# Patient Record
Sex: FEMALE | HISPANIC OR LATINO | Employment: UNEMPLOYED | URBAN - NONMETROPOLITAN AREA
[De-identification: names, ages, dates, MRNs, and addresses within clinical notes are randomized per-mention and may not be internally consistent; named-entity substitution may affect disease eponyms.]

---

## 2024-11-21 ENCOUNTER — OFFICE VISIT (OUTPATIENT)
Dept: OBSTETRICS AND GYNECOLOGY | Age: 19
End: 2024-11-21
Payer: MEDICAID

## 2024-11-21 VITALS
WEIGHT: 134.2 LBS | DIASTOLIC BLOOD PRESSURE: 72 MMHG | HEIGHT: 56 IN | BODY MASS INDEX: 30.19 KG/M2 | SYSTOLIC BLOOD PRESSURE: 106 MMHG

## 2024-11-21 DIAGNOSIS — Z30.09 ENCOUNTER FOR OTHER GENERAL COUNSELING OR ADVICE ON CONTRACEPTION: ICD-10-CM

## 2024-11-21 DIAGNOSIS — Z30.46 ENCOUNTER FOR NEXPLANON REMOVAL: Primary | ICD-10-CM

## 2024-11-21 NOTE — PROGRESS NOTES
"    Rome Herron MD  [facility]  [address]  [place] [address]  Office [phone]  Fax [phone]      [facility]  Isabella Arthur  : 2005  MRN: [id number]    Subjective   Subjective     Chief Complaint   Patient presents with    Contraception     Pt here as new gyn for Nexplanon removal. States this was inserted . Does not want new contraception.   ID # [id number]       History of Present Illness  Isabella Arthur is a 19 y.o. female , No obstetric history on file., who comes to the office today for nexplanon removal. Does not desire contraception. Not sexually active. .      Objective    Objective     Vitals:   Visit Vitals  /72   Ht 143 cm (56.3\")   Wt 60.9 kg (134 lb 3.2 oz)   BMI 29.77 kg/m²        Physical Exam  Vitals reviewed. Exam conducted with a chaperone present.   Constitutional:       General: She is not in acute distress.     Appearance: Normal appearance. She is not ill-appearing.   HENT:      Head: Normocephalic and atraumatic.      Nose: No congestion or rhinorrhea.   Eyes:      General: No scleral icterus.        Right eye: No discharge.         Left eye: No discharge.      Extraocular Movements: Extraocular movements intact.      Conjunctiva/sclera: Conjunctivae normal.   Pulmonary:      Effort: Pulmonary effort is normal. No accessory muscle usage or respiratory distress.   Musculoskeletal:        Arms:       Right lower leg: No edema.      Left lower leg: No edema.   Skin:     General: Skin is warm and dry.      Coloration: Skin is not ashen, cyanotic or jaundiced.   Neurological:      General: No focal deficit present.      Mental Status: She is alert and oriented to person, place, and time.   Psychiatric:         Mood and Affect: Mood normal.         Behavior: Behavior is cooperative.         Procedure   The following procedures were performed in the clinic today:  Nexplanon removal    Date/Time: 2024 10:12 AM    Performed by: Germaine" "Rome IBRAHIM MD  Authorized by: Rome Herron MD  Consent: Verbal consent obtained.  Risks and benefits: risks, benefits and alternatives were discussed  Consent given by: patient  Patient understanding: patient states understanding of the procedure being performed  Patient identity confirmed: verbally with patient  Time out: Immediately prior to procedure a \"time out\" was called to verify the correct patient, procedure, equipment, support staff and site/side marked as required.  Preparation: Patient was prepped and draped in the usual sterile fashion.  Local anesthesia used: yes  Anesthesia: local infiltration    Anesthesia:  Local anesthesia used: yes  Local Anesthetic: lidocaine 1% without epinephrine  Anesthetic total: 6 mL    Sedation:  Patient sedated: no    Patient tolerance: patient tolerated the procedure well with no immediate complications  Comments: Procedure: Consent obtained. A time-out was performed prior to initiating procedure to confirm correct patient, correct procedure, and correct location. The site was anesthetized with lidocaine. The area surrounding the Nexplanon was prepared and draped in the usual sterile manner. A skin incision was made over the distal aspect of the device. The capsule lysed sharply and the device removed using a hemostat. Hemostasis was assured. The device was visibly shown to the patient to confirm device removal. The Nexplanon was measured and was found to be 4 cm in length. The site was dressed with Dermabond/SteriStrips and a pressure dressing. The patient tolerated the procedure well.       Follow-up: The patient tolerated the procedure well without complications. Standard post-procedure care was discussed and return precautions are given. Contraception is advised until conception is desired. Discussed to remove the Steri-Strips in 3-5 days. Discussed to remove the compressive bandage in 24 hrs.                      Assessment & Plan   Assessment / Plan     Diagnoses " and all orders for this visit:    1. Encounter for Nexplanon removal (Primary)    2. Encounter for other general counseling or advice on contraception    Other orders  -     Remove Drug Implant Device        Discussion: Postprocedure care discussed. Patient declines contraception. Not sexually active. Recommended return for annual.     Follow-up: Return in about 3 months (around 2/21/2025), or if symptoms worsen or fail to improve, for annual.    Rome Herron MD

## 2025-05-20 ENCOUNTER — OFFICE VISIT (OUTPATIENT)
Dept: OBSTETRICS AND GYNECOLOGY | Age: 20
End: 2025-05-20

## 2025-05-20 VITALS
DIASTOLIC BLOOD PRESSURE: 70 MMHG | WEIGHT: 143 LBS | BODY MASS INDEX: 32.17 KG/M2 | SYSTOLIC BLOOD PRESSURE: 100 MMHG | HEIGHT: 56 IN

## 2025-05-20 DIAGNOSIS — N91.2 AMENORRHEA: Primary | ICD-10-CM

## 2025-05-20 PROCEDURE — 99214 OFFICE O/P EST MOD 30 MIN: CPT | Performed by: NURSE PRACTITIONER

## 2025-05-21 LAB
ALBUMIN SERPL-MCNC: 4.4 G/DL (ref 3.5–5.2)
ALBUMIN/GLOB SERPL: 1.4 G/DL
ALP SERPL-CCNC: 131 U/L (ref 39–117)
ALT SERPL-CCNC: 53 U/L (ref 1–33)
AST SERPL-CCNC: 30 U/L (ref 1–32)
BASOPHILS # BLD AUTO: 0.03 10*3/MM3 (ref 0–0.2)
BASOPHILS NFR BLD AUTO: 0.7 % (ref 0–1.5)
BILIRUB SERPL-MCNC: 0.6 MG/DL (ref 0–1.2)
BUN SERPL-MCNC: 15 MG/DL (ref 6–20)
BUN/CREAT SERPL: 30.6 (ref 7–25)
CALCIUM SERPL-MCNC: 9.3 MG/DL (ref 8.6–10.5)
CHLORIDE SERPL-SCNC: 103 MMOL/L (ref 98–107)
CO2 SERPL-SCNC: 25.1 MMOL/L (ref 22–29)
CREAT SERPL-MCNC: 0.49 MG/DL (ref 0.57–1)
EGFRCR SERPLBLD CKD-EPI 2021: 136.9 ML/MIN/1.73
EOSINOPHIL # BLD AUTO: 0.1 10*3/MM3 (ref 0–0.4)
EOSINOPHIL NFR BLD AUTO: 2.2 % (ref 0.3–6.2)
ERYTHROCYTE [DISTWIDTH] IN BLOOD BY AUTOMATED COUNT: 12.4 % (ref 12.3–15.4)
ESTRADIOL SERPL-MCNC: 42.8 PG/ML
FSH SERPL-ACNC: 6.1 MIU/ML
GLOBULIN SER CALC-MCNC: 3.2 GM/DL
GLUCOSE SERPL-MCNC: 88 MG/DL (ref 65–99)
HCG UR QL: NEGATIVE
HCT VFR BLD AUTO: 41.7 % (ref 34–46.6)
HGB BLD-MCNC: 14.4 G/DL (ref 12–15.9)
IMM GRANULOCYTES # BLD AUTO: 0 10*3/MM3 (ref 0–0.05)
IMM GRANULOCYTES NFR BLD AUTO: 0 % (ref 0–0.5)
INSULIN SERPL-ACNC: 18.4 UIU/ML (ref 2.6–24.9)
LH SERPL-ACNC: 18.9 MIU/ML
LYMPHOCYTES # BLD AUTO: 1.92 10*3/MM3 (ref 0.7–3.1)
LYMPHOCYTES NFR BLD AUTO: 41.9 % (ref 19.6–45.3)
MCH RBC QN AUTO: 29.9 PG (ref 26.6–33)
MCHC RBC AUTO-ENTMCNC: 34.5 G/DL (ref 31.5–35.7)
MCV RBC AUTO: 86.5 FL (ref 79–97)
MONOCYTES # BLD AUTO: 0.32 10*3/MM3 (ref 0.1–0.9)
MONOCYTES NFR BLD AUTO: 7 % (ref 5–12)
NEUTROPHILS # BLD AUTO: 2.21 10*3/MM3 (ref 1.7–7)
NEUTROPHILS NFR BLD AUTO: 48.2 % (ref 42.7–76)
NRBC BLD AUTO-RTO: 0 /100 WBC (ref 0–0.2)
PLATELET # BLD AUTO: 223 10*3/MM3 (ref 140–450)
POTASSIUM SERPL-SCNC: 4.4 MMOL/L (ref 3.5–5.2)
PROLACTIN SERPL-MCNC: 4.3 NG/ML (ref 4.8–33.4)
PROT SERPL-MCNC: 7.6 G/DL (ref 6–8.5)
RBC # BLD AUTO: 4.82 10*6/MM3 (ref 3.77–5.28)
SODIUM SERPL-SCNC: 139 MMOL/L (ref 136–145)
TESTOST SERPL-MCNC: 18 NG/DL (ref 13–71)
TSH SERPL DL<=0.005 MIU/L-ACNC: 0.76 UIU/ML (ref 0.27–4.2)
WBC # BLD AUTO: 4.58 10*3/MM3 (ref 3.4–10.8)

## 2025-05-21 NOTE — PROGRESS NOTES
"Chief Complaint   Patient presents with    dysfunctional uterine  bleeding      Pt here today with c/o spotting after having nexplanon removed in November. Pt voices she has not had a regular period since that time and is concerned about that. Pt had ultrasound today.        History:  Isabella Arthur is a 20 y.o. female who presents today for follow-up for evaluation of the above:  Pt comes in today with complaints of no period besides one episode of spotting right after removal of nexplanon in November.           ROS:  Review of Systems   Constitutional:  Negative for activity change and unexpected weight loss.   HENT:  Negative for congestion.    Cardiovascular:  Negative for chest pain.   Gastrointestinal:  Negative for blood in stool, constipation and diarrhea.   Endocrine: Negative for cold intolerance and heat intolerance.   Genitourinary:  Positive for amenorrhea. Negative for dyspareunia, pelvic pain and vaginal discharge.   Musculoskeletal:  Negative for arthralgias, back pain, neck pain and neck stiffness.   Skin:  Negative for rash.   Neurological:  Negative for dizziness and headache.   Psychiatric/Behavioral:  Negative for sleep disturbance. The patient is not nervous/anxious.        Ms. Arthur  reports that she has never smoked. She has never used smokeless tobacco. She reports that she does not currently use alcohol. She reports that she does not use drugs.    No current outpatient medications on file.      OBJECTIVE:  /70   Ht 143 cm (56.3\")   Wt 64.9 kg (143 lb)   LMP  (LMP Unknown)   BMI 31.72 kg/m²    Physical Exam  Vitals and nursing note reviewed.   Constitutional:       Appearance: She is well-developed.   HENT:      Head: Normocephalic and atraumatic.   Eyes:      General:         Right eye: No discharge.         Left eye: No discharge.      Conjunctiva/sclera: Conjunctivae normal.   Neck:      Thyroid: No thyromegaly.   Cardiovascular:      Rate and Rhythm: Normal rate and regular " rhythm.      Heart sounds: Normal heart sounds. No murmur heard.  Pulmonary:      Effort: Pulmonary effort is normal.      Breath sounds: Normal breath sounds.   Musculoskeletal:      Cervical back: Normal range of motion and neck supple.   Skin:     General: Skin is warm and dry.   Neurological:      Mental Status: She is alert and oriented to person, place, and time.   Psychiatric:         Mood and Affect: Mood normal.         Behavior: Behavior normal.         Thought Content: Thought content normal.         Judgment: Judgment normal.         Assessment/Plan    Diagnoses and all orders for this visit:    1. Amenorrhea (Primary)  -     CBC & Differential  -     Comprehensive Metabolic Panel  -     Estradiol  -     FSH & LH  -     Insulin, Total  -     TSH  -     Testosterone  -     Prolactin  -     Pregnancy, Urine - Urine, Clean Catch    Pt comes in today with complaints of not having had a period since spotting right after nexplanon was removed in November.   Ultrasound today was normal.  Discussed options including starting on birth control, vs lab work, vs inducing period. Pt wishes to proceed with lab work then induce period pending those results. Isn't interested in birth control.          An After Visit Summary was printed and given to the patient at discharge.  No follow-ups on file. Sooner if problems arise.          KAY Villafana  Electronically Signed

## 2025-05-22 DIAGNOSIS — N91.2 AMENORRHEA: Primary | ICD-10-CM

## 2025-05-22 RX ORDER — MEDROXYPROGESTERONE ACETATE 10 MG
10 TABLET ORAL DAILY
Qty: 10 TABLET | Refills: 0 | Status: SHIPPED | OUTPATIENT
Start: 2025-05-22 | End: 2025-06-01